# Patient Record
Sex: MALE | Race: WHITE
[De-identification: names, ages, dates, MRNs, and addresses within clinical notes are randomized per-mention and may not be internally consistent; named-entity substitution may affect disease eponyms.]

---

## 2022-07-13 ENCOUNTER — HOSPITAL ENCOUNTER (EMERGENCY)
Dept: HOSPITAL 12 - ER | Age: 47
Discharge: HOME | End: 2022-07-13
Payer: COMMERCIAL

## 2022-07-13 VITALS — WEIGHT: 150 LBS | BODY MASS INDEX: 21.47 KG/M2 | HEIGHT: 70 IN
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
## 2022-07-13 DIAGNOSIS — S69.92XA: Primary | ICD-10-CM

## 2022-07-13 PROCEDURE — A4663 DIALYSIS BLOOD PRESSURE CUFF: HCPCS

## 2022-07-13 NOTE — NUR
Splinted distal fingers w/tongue depressor and anahi taped fingers.  PMS 
intact.



Gave pt d/c instructions, pt verbalized understanding.